# Patient Record
(demographics unavailable — no encounter records)

---

## 2025-04-14 NOTE — PHYSICAL EXAM
[Chaperoned Physical Exam] : A chaperone was present in the examining room during all aspects of the physical examination. [MA] : MA [Appropriately responsive] : appropriately responsive [Alert] : alert [No Acute Distress] : no acute distress [Soft] : soft [Non-tender] : non-tender [Non-distended] : non-distended [Oriented x3] : oriented x3 [Examination Of The Breasts] : a normal appearance [No Masses] : no breast masses were palpable [FreeTextEntry2] : SALBADOR Ruiz [No Discharge] : no discharge [Labia Majora] : normal [Labia Minora] : normal [Normal] : normal [Uterine Adnexae] : normal

## 2025-04-14 NOTE — HISTORY OF PRESENT ILLNESS
[postmenopausal] : postmenopausal [Y] : Patient is sexually active [Monogamous (Male Partner)] : is monogamous with a male partner [Difficulty with [place]] : difficulty with intercourse [Vaginal Lubrication] : vaginal lubrication [Yes] : Patient has concerns regarding sex [Currently Active] : currently active [Men] : men [No] : No [Mammogramdate] : 12/21 [TextBox_19] : helen [PapSmeardate] : 12/21 [PGHxTotal] : 2 [PGHxAbortions] : 1 [PGHxABSpont] : 1 [[facility]] : 1 [FreeTextEntry1] : vaginal dryness

## 2025-05-20 NOTE — PROCEDURE
[Hysteroscopy] : Hysteroscopy [Time out performed] : Pre-procedure time out performed.  Patient's name, date of birth and procedure confirmed. [Consent Obtained] : Consent obtained [Risks] : risks [Benefits] : benefits [Alternatives] : alternatives [Patient] : patient [Infection] : infection [Bleeding] : bleeding [Allergic Reaction] : allergic reaction [rigid] : Using aseptic technique a hysteroscopy was performed using a rigid hysteroscope [Sent to Pathology] : specimen was placed in buffered formalin and sent for pathology [Antibiotics given] : antibiotics not given [Hemostasis obtained] : hemostasis obtained [Tolerated Well] : Patient tolerated the procedure well [Aftercare instructions/regstrictions given and follow-up scheduled] : Aftercare instructions/restrictions given and follow-up scheduled [de-identified] : endometrial cells on Pap after age 45 [de-identified] : Hysteroscopy performed under IV sedation Speculum placed in vagina, cervix stabilized with tenaculum,  cervix dilated os finder followed by Prem dilators to allow passage of 5 mm hysteroscope Uterine cavity inspected under direct visualization, both ostia identified minimal tissue seen, lining mostly atrophic endometrial sampling obtained using Endo sampler, sent out to pathology all instruments removed from vagina pt tolerated procedure well hemostasis was obtained